# Patient Record
(demographics unavailable — no encounter records)

---

## 2025-01-29 NOTE — DISCUSSION/SUMMARY
[___ Month(s)] : in [unfilled] month(s) [FreeTextEntry3] : Or sooner if needed. [EKG obtained to assist in diagnosis and management of assessed problem(s)] : EKG obtained to assist in diagnosis and management of assessed problem(s)

## 2025-01-29 NOTE — CARDIOLOGY SUMMARY
[de-identified] : 1/29/25, Suspect atrial fibrillation. 1/29/24, Sinus Bradycardia , -Left atrial enlargement.-Anteroseptal infarct -age undetermined.  [de-identified] : 7/28/21, EF 55%, limited views.
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
## 2025-02-27 NOTE — REASON FOR VISIT
What Is The Reason For Today's Visit?: Full Body Skin Examination with No Concerns What Is The Reason For Today's Visit? (Being Monitored For X): concerning skin lesions on a periodic basis How Severe Are Your Spot(S)?: moderate [Follow-Up: _____] : a [unfilled] follow-up visit
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
## 2025-03-03 NOTE — PHYSICAL EXAM
[FreeTextEntry1] : Exam limited via telehealth: MS: awake, alert, coherent, fluent, comprehension intact, affect stable. oriented CN: EOMI, face symmetrical, grimace, smile symmetrical, eye closure symmetrical, hearing intact grossly.  Motor: moving all 4 extremities freely. Coord: FFM and to screen intact Walking / Sensation deferred. Cardiac/pulmonary/extremity exam deferred via telehealth.

## 2025-03-03 NOTE — ASSESSMENT
[FreeTextEntry1] : MR DEBBY BRODY is 68 yo male with history of 3 recent seizures in the setting of ETOH abuse and head contusion/SAH from fall. Currently getting lacosamide and levetiracetam for recurrent seizures. Not using alcohol any longer and seizure control good when taking medications. For now will continue both LAC and LEV, but over time may taper off LEV, nick if there are side effects.  Plan: 1. continue current ASM regimen -lacosamide 200 q12, levetiracetam  q12 2.reviewed seizure triggers 3. annual labwork 4. follow up in 6 months

## 2025-03-03 NOTE — REASON FOR VISIT
[Home] : at home, [unfilled] , at the time of the visit. [Medical Office: ([facility])___] : at the medical office located in  [Telehealth (audio & video)] : This visit was provided via telehealth using real-time 2-way audio visual technology. [Verbal consent obtained from patient] : the patient, [unfilled] [Follow-Up: _____] : a [unfilled] follow-up visit [Family Member] : family member

## 2025-03-03 NOTE — ASSESSMENT
[FreeTextEntry1] : MR DEBBY BRODY is 69 yo male with history of 3 seizures in the setting of ETOH abuse and head contusion/SAH from fall. Last sz 02/05/2024, Now on Levetiracetam 750 mg ER bid and Lacosamide 200 mg bid well tolerated, compliant. Patient denies using alcohol now. Telemed exam (limited) non focal. No new complains.    Plan: - Cont Levetiracetam 750 mg ER bid - Cont Lacosamide 200 mg bid  - Reviewed seizure triggers - Daughter will email [id number] Medical Certification for Disability Exceptions  -  all questions answered - Follow up in 6 months with NO Monteiro, knows to reach out sooner prn   x time 25 min

## 2025-03-03 NOTE — HISTORY OF PRESENT ILLNESS
[FreeTextEntry1] : cardiologist Monik  [phone] Current meds: Levetiracetam 750 mg ER bid Lacosamide 200 mg bid   *** 02/28/2025 ***   New patient to me, seen by NO Weber (since retired). GAGANDEEP SILVERIO 69 yo RHM, accompanied by his daughter who translates    ***:5/28/2024** Mr Gagandeep Silverio is here today for a scheduled follow up office visit and is accompanied by his daughter He is doing well with no reported interval seizures. No  complaints or side effects  ***:2/26/2024** Mr Gagandeep Silverio is here today for a scheduled follow up office visit and is accompanied by his daughter who translates  Recent hospital admission as per below:   Hospital Course: Discharge Date	08-Feb-2024 Admission Date	05-Feb-2024 22:48 Reason for Admission	Seizures Hospital Course	 Patient is a 67-year-old male with past medical history of EtOH abuse last drink 1 year ago per daughter, HTN, paroxismal a fib not on AC, subarachnoid bleed in January 2021, seizures currently on Vimpat presents to emergency department with witnessed seizure.  Daughter reported patient became unresponsive and had generalized shaking lasting about 3 mins.  Patient brought to emergency department by EMS.  Patient had another witnessed seizure while in the ambulance bay lasting 2 to 3 minutes stopping without medications. Daughter states that patient recently stopped Keppra as per his neurologist about a week ago and is currently only taking Vimpat.  Daughter denies any recent illness.  No recent alcohol consumption per daughter.   Hx of Seizures with recurrent episodes - Neuro consulted, no further inpatient work up needed - continue Keppra 750 mg ER BID and Vimpat 200 mg BID - Follow up with Dr. Sims as outpatient.   HTN - stable - will monitor and start BP meds if needed   Paroxismal a fib - was diagnosed on previous admission - at that time, chadsvasc score was 1, not on AC given hx of SAH, active ETOH and recurrent fall risk of starting AC is outweigh of benefit - ASA - Cards consulted - SALLIE w/  nL LVSF and EF of 58% w/ no WMA, trace MR   HX of Subarachnoid bleed - Chronic Hx - CT head w/o evidence of bleed   On 2/8/24,  case was discussed with , patient is medically cleared and optimized for discharge today. All medications were reviewed with attending, and sent to mutually agreed upon pharmacy.     Med Reconciliation: Override IMPROVE-DD recommendations due to:	IMPROVE-DD Application Not Available Recommended Post-Discharge VTE Prophylaxis	IMPROVE-DD Application Not Available Medication Reconciliation Status	Admission Reconciliation is Completed Discharge Reconciliation is Completed   Discharge Medications	aspirin 81 mg oral tablet, chewable: 1 tab(s) orally once a day folic acid 1 mg oral tablet: 1 tab(s) orally once a day Keppra  mg oral tablet, extended release: 1 tab(s) orally every 12 hours lacosamide 200 mg oral tablet: 1 tab(s) orally 2 times a day MDD:400mg Lubrifresh P.M. ophthalmic ointment: 1 application in the right eye once a day (at bedtime) Multiple Vitamins oral tablet: 1 tab(s) orally once a day Systane ophthalmic solution: 1 drop(s) in the right eye every 2 hours tobramycin 0.3% ophthalmic solution: 1 drop(s) in the right eye 4 times a day     ***Of note clarified that I did not advise patient to stop Levetiracetam. Patient stopped on his own which triggered  the seizure  No reported interval seizures Mood better ,   ***UPDATE:12/20/2023*** Mr Gagandeep Silverio is here today for a scheduled follow up office visit and  is accompanied by his daughter. He reports no interval seizures or side effects. He has not followed up with cardiology as suggested at last office visit  Lacosamide 200 mg BID Levetiracetam 750 mg BID  *** 08/02/2023  *** [facility]. [facility]IL[facility]IO [facility] for breakthrough seizure that occurred in setting of having reduced his ASM dose to stretch out supply.  Mr. SILVERIO has not used alcohol in >2 yrs.  Also found to be in Afib, with decision to treat for rate control but not give AC due to fall risk from seizures.  EKG reportedly shows bundle branch block as well.  He was started on LEV in addition to LAC, and currently reports no side effects. No symptoms of increased irritation or mood change per family member.   *** 08/02/2021  *** Mr. SILVERIO had seizure 7/29/21 after stopping Vimpat x 4 days (due to family concern for resumption of drinking.) He has not taken lacosamide since discharge from hospital last Friday due to need to renew PA.  CT done in hospital shows chronic encephalomalacia in R frontal region, no subdural collection or acute change. DC summary notes that Mr. SILVERIO was found to have new onset afib but deemed that AC risks outweigh benefit given h/o ICH. Mr. SILVERIO was started on ASA instead.   ***UPDATE:4/1/2021*** MR GAGANDEEP SILVERIO is here today for a scheduled follow up visit. He is accompanied by his daughter. Patient had a recent admission to [facility] after a seizure.Apparently he had run out of his Vimpat for 3 days and did not get refill . He suffered a subsequent seizure. He continues to deny any ETOH use  Vimpat 200mg BID  *** 03/09/2021  *** MR GAGANDEEP SILVERIO is a 65 yo right handed male and s here today for a new patient vist . He is accompanied by his daughter who helps with his history. Mr Silverio has a history of ETOH abuse . He suffered a fall with loss of consciousness ~ 5 minutes while intoxicated on January 28, 2021 and was taken to  [facility].He was noted to have right frontal contusion and right cerebral SAH on imaging. He was   transferred to [facility] on 1/20/2021 where he suffered ETOH withdrawal and 3 subsequent seizures in one day. A REEG revealed right temporal subclinical seizure.  He developed aspiration pneumonia and a G tube was placed due to difficulty swallowing. Eventually he was sent to rehab 2/18/2021 and discharged home on 2/22/2021  Mr Awad daughter reports no use of  ETOH since leaving the hospital. He has some complaints of dizziness. Low BP after leaving hospital sees cardiology   current medication regimen: Vimpat  200mg BID Nadolol 20mg daily Folic acid Clonidine 0.1 mg daily Gabapentin 100mg BID multivitamin  Pantoprazole 40mg daily

## 2025-03-03 NOTE — ASSESSMENT
[FreeTextEntry1] : MR DEBBY BRODY is 67 yo male with history of 3 seizures in the setting of ETOH abuse and head contusion/SAH from fall. Last sz 02/05/2024, Now on Levetiracetam 750 mg ER bid and Lacosamide 200 mg bid well tolerated, compliant. Patient denies using alcohol now. Telemed exam (limited) non focal. No new complains.    Plan: - Cont Levetiracetam 750 mg ER bid - Cont Lacosamide 200 mg bid  - Reviewed seizure triggers - Daughter will email [id number] Medical Certification for Disability Exceptions  -  all questions answered - Follow up in 6 months with NO Monteiro, knows to reach out sooner prn   x time 25 min

## 2025-03-03 NOTE — REASON FOR VISIT
[Home] : at home, [unfilled] , at the time of the visit. [Medical Office: ([facility])___] : at the medical office located in  [Telehealth (audio & video)] : This visit was provided via telehealth using real-time 2-way audio visual technology. [Verbal consent obtained from patient] : the patient, [unfilled] [Follow-Up: _____] : a [unfilled] follow-up visit [Family Member] : family member

## 2025-06-17 NOTE — DATA REVIEWED
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

## 2025-06-17 NOTE — ASSESSMENT
[FreeTextEntry1] : DEBBY BRODY 67 yo RHM with past medical history of EtOH abuse, HTN, paroxismal a fib not on AC, subarachnoid bleed in January 2021 and seizures in the setting of ETOH abuse and head contusion/SAH from fall. Last sz 02/05/2024, Now on Levetiracetam 750 mg ER bid and Lacosamide 200 mg bid well tolerated, compliant.  No interval seizures reported As per the daughter progressive memory issues, change in a behavior, mood fluctuations. Will update diagnostic testing and refer to memory team,    Plan: - Cont Levetiracetam 750 mg ER bid - Cont Lacosamide 200 mg bid  - Reviewed seizure triggers - ordered 24hr aEEG  - ordered MRI brain wwo gado - referred to memory team -  all questions answered - Follow up with results with NO Monteiro, knows to reach out sooner prn   x time 25 min

## 2025-06-17 NOTE — HISTORY OF PRESENT ILLNESS
[FreeTextEntry1] : cardiologist Monik  [phone]  Current ASMs: Levetiracetam  mg bid  Lacosamide 200 mg bid   *** 06/17/2025 *** accompanied by his daughter Izabella who translates from [employer].  GAGANDEEP BRODY 69 yo RHM with past medical history of EtOH abuse, HTN, paroxismal a fib not on AC, subarachnoid bleed in January 2021, here for a follow up visit  No interval seizures. On Levetiracetam  mg bid and Lacosamide 200 mg bid, well tolerated.  Last seizure Feb 5 2024.  As per daughter since May 2025 patient has been increasingly agitated and paranoid says he needs to go back to his country or live by himself. He does not drive, lives with wife, 3 daughters and 2 grandkids. Denies drinking ETOH. 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   *** 03/03/2025 ***   New patient to me, seen by NO Weber (since retired). GAGANDEEP BRODY 69 yo RHM, accompanied by his daughter Izabella who translates from [employer].  No interval seizures reported on Levetiracetam 750 mg ER bid and Lacosamide 200 mg bid, well tolerated, compliant, his daughter and wife remind him to take his ASMs. Last seizure Feb 5 2024. He has difficulties with concentration and memory non progressing. He does not drive, dos not drink alcohol, smokes cigarettes. Lives with wife.  No new complains.    ***:5/28/2024** Mr Gagandeep Brody is here today for a scheduled follow up office visit and is accompanied by his daughter He is doing well with no reported interval seizures. No complaints or side effects  ***:2/26/2024** Mr Gagandeep Brody is here today for a scheduled follow up office visit and is accompanied by his daughter who translates  Recent hospital admission as per below:   Hospital Course: Discharge Date	08-Feb-2024 Admission Date	05-Feb-2024 22:48 Reason for Admission	Seizures Hospital Course	 Patient is a 67-year-old male with past medical history of EtOH abuse last drink 1 year ago per daughter, HTN, paroxismal a fib not on AC, subarachnoid bleed in January 2021, seizures currently on Vimpat presents to emergency department with witnessed seizure.  Daughter reported patient became unresponsive and had generalized shaking lasting about 3 mins.  Patient brought to emergency department by EMS.  Patient had another witnessed seizure while in the ambulance bay lasting 2 to 3 minutes stopping without medications. Daughter states that patient recently stopped Keppra as per his neurologist about a week ago and is currently only taking Vimpat.  Daughter denies any recent illness.  No recent alcohol consumption per daughter.   Hx of Seizures with recurrent episodes - Neuro consulted, no further inpatient work up needed - continue Keppra 750 mg ER BID and Vimpat 200 mg BID - Follow up with Dr. Sims as outpatient.   HTN - stable - will monitor and start BP meds if needed   Paroxismal a fib - was diagnosed on previous admission - at that time, chadsvasc score was 1, not on AC given hx of SAH, active ETOH and recurrent fall risk of starting AC is outweigh of benefit - ASA - Cards consulted - SALLIE w/  nL LVSF and EF of 58% w/ no WMA, trace MR   HX of Subarachnoid bleed - Chronic Hx - CT head w/o evidence of bleed   On 2/8/24,  case was discussed with , patient is medically cleared and optimized for discharge today. All medications were reviewed with attending, and sent to mutually agreed upon pharmacy.     Med Reconciliation: Override IMPROVE-DD recommendations due to:	IMPROVE-DD Application Not Available Recommended Post-Discharge VTE Prophylaxis	IMPROVE-DD Application Not Available Medication Reconciliation Status	Admission Reconciliation is Completed Discharge Reconciliation is Completed   Discharge Medications	aspirin 81 mg oral tablet, chewable: 1 tab(s) orally once a day folic acid 1 mg oral tablet: 1 tab(s) orally once a day Keppra  mg oral tablet, extended release: 1 tab(s) orally every 12 hours lacosamide 200 mg oral tablet: 1 tab(s) orally 2 times a day MDD:400mg Lubrifresh P.M. ophthalmic ointment: 1 application in the right eye once a day (at bedtime) Multiple Vitamins oral tablet: 1 tab(s) orally once a day Systane ophthalmic solution: 1 drop(s) in the right eye every 2 hours tobramycin 0.3% ophthalmic solution: 1 drop(s) in the right eye 4 times a day     ***Of note clarified that I did not advise patient to stop Levetiracetam. Patient stopped on his own which triggered  the seizure  No reported interval seizures Mood better ,   ***UPDATE:12/20/2023*** Mr Gagandeep Brody is here today for a scheduled follow up office visit and  is accompanied by his daughter. He reports no interval seizures or side effects. He has not followed up with cardiology as suggested at last office visit  Lacosamide 200 mg BID Levetiracetam 750 mg BID  *** 08/02/2023  *** Mr. BRODY was hospitalized at [facility] in early July for breakthrough seizure that occurred in setting of having reduced his ASM dose to stretch out supply.  Mr. BRODY has not used alcohol in >2 yrs.  Also found to be in Afib, with decision to treat for rate control but not give AC due to fall risk from seizures.  EKG reportedly shows bundle branch block as well.  He was started on LEV in addition to LAC, and currently reports no side effects. No symptoms of increased irritation or mood change per family member.   *** 08/02/2021  *** Mr. BRODY had seizure 7/29/21 after stopping Vimpat x 4 days (due to family concern for resumption of drinking.) He has not taken lacosamide since discharge from hospital last Friday due to need to renew PA.  CT done in hospital shows chronic encephalomalacia in R frontal region, no subdural collection or acute change. DC summary notes that Mr. BRODY was found to have new onset afib but deemed that AC risks outweigh benefit given h/o ICH. Mr. BRODY was started on ASA instead.   ***UPDATE:4/1/2021*** MR GAGANDEEP BRODY is here today for a scheduled follow up visit. He is accompanied by his daughter. Patient had a recent admission to [facility] after a seizure.Apparently he had run out of his Vimpat for 3 days and did not get refill . He suffered a subsequent seizure. He continues to deny any ETOH use  Vimpat 200mg BID  *** 03/09/2021  *** MR GAGANDEEP BRODY is a 65 yo right handed male and s here today for a new patient vist . He is accompanied by his daughter who helps with his history. Mr Brody has a history of ETOH abuse . He suffered a fall with loss of consciousness ~ 5 minutes while intoxicated on January 28, 2021 and was taken to  [facility].He was noted to have right frontal contusion and right cerebral SAH on imaging. He was   transferred to [facility] on 1/20/2021 where he suffered ETOH withdrawal and 3 subsequent seizures in one day. A REEG revealed right temporal subclinical seizure.  He developed aspiration pneumonia and a G tube was placed due to difficulty swallowing. Eventually he was sent to rehab 2/18/2021 and discharged home on 2/22/2021  Mr Delmi daughter reports no use of  ETOH since leaving the hospital. He has some complaints of dizziness. Low BP after leaving hospital sees cardiology   current medication regimen: Vimpat  200mg BID Nadolol 20mg daily Folic acid Clonidine 0.1 mg daily Gabapentin 100mg BID multivitamin  Pantoprazole 40mg daily

## 2025-06-17 NOTE — HISTORY OF PRESENT ILLNESS
[FreeTextEntry1] : cardiologist Monik  [phone]  Current ASMs: Levetiracetam  mg bid  Lacosamide 200 mg bid   *** 06/17/2025 *** accompanied by his daughter Izabella who translates from [employer].  GAGANDEEP BRODY 67 yo RHM with past medical history of EtOH abuse, HTN, paroxismal a fib not on AC, subarachnoid bleed in January 2021, here for a follow up visit  No interval seizures. On Levetiracetam  mg bid and Lacosamide 200 mg bid, well tolerated.  Last seizure Feb 5 2024.  As per daughter since May 2025 patient has been increasingly agitated and paranoid says he needs to go back to his country or live by himself. He does not drive, lives with wife, 3 daughters and 2 grandkids. Denies drinking ETOH. 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   *** 03/03/2025 ***   New patient to me, seen by NO Weber (since retired). GAGANDEEP BRODY 67 yo RHM, accompanied by his daughter Izabella who translates from [employer].  No interval seizures reported on Levetiracetam 750 mg ER bid and Lacosamide 200 mg bid, well tolerated, compliant, his daughter and wife remind him to take his ASMs. Last seizure Feb 5 2024. He has difficulties with concentration and memory non progressing. He does not drive, dos not drink alcohol, smokes cigarettes. Lives with wife.  No new complains.    ***:5/28/2024** Mr Gagandeep Brody is here today for a scheduled follow up office visit and is accompanied by his daughter He is doing well with no reported interval seizures. No complaints or side effects  ***:2/26/2024** Mr Gagandeep Brody is here today for a scheduled follow up office visit and is accompanied by his daughter who translates  Recent hospital admission as per below:   Hospital Course: Discharge Date	08-Feb-2024 Admission Date	05-Feb-2024 22:48 Reason for Admission	Seizures Hospital Course	 Patient is a 67-year-old male with past medical history of EtOH abuse last drink 1 year ago per daughter, HTN, paroxismal a fib not on AC, subarachnoid bleed in January 2021, seizures currently on Vimpat presents to emergency department with witnessed seizure.  Daughter reported patient became unresponsive and had generalized shaking lasting about 3 mins.  Patient brought to emergency department by EMS.  Patient had another witnessed seizure while in the ambulance bay lasting 2 to 3 minutes stopping without medications. Daughter states that patient recently stopped Keppra as per his neurologist about a week ago and is currently only taking Vimpat.  Daughter denies any recent illness.  No recent alcohol consumption per daughter.   Hx of Seizures with recurrent episodes - Neuro consulted, no further inpatient work up needed - continue Keppra 750 mg ER BID and Vimpat 200 mg BID - Follow up with Dr. Sims as outpatient.   HTN - stable - will monitor and start BP meds if needed   Paroxismal a fib - was diagnosed on previous admission - at that time, chadsvasc score was 1, not on AC given hx of SAH, active ETOH and recurrent fall risk of starting AC is outweigh of benefit - ASA - Cards consulted - SALLIE w/  nL LVSF and EF of 58% w/ no WMA, trace MR   HX of Subarachnoid bleed - Chronic Hx - CT head w/o evidence of bleed   On 2/8/24,  case was discussed with , patient is medically cleared and optimized for discharge today. All medications were reviewed with attending, and sent to mutually agreed upon pharmacy.     Med Reconciliation: Override IMPROVE-DD recommendations due to:	IMPROVE-DD Application Not Available Recommended Post-Discharge VTE Prophylaxis	IMPROVE-DD Application Not Available Medication Reconciliation Status	Admission Reconciliation is Completed Discharge Reconciliation is Completed   Discharge Medications	aspirin 81 mg oral tablet, chewable: 1 tab(s) orally once a day folic acid 1 mg oral tablet: 1 tab(s) orally once a day Keppra  mg oral tablet, extended release: 1 tab(s) orally every 12 hours lacosamide 200 mg oral tablet: 1 tab(s) orally 2 times a day MDD:400mg Lubrifresh P.M. ophthalmic ointment: 1 application in the right eye once a day (at bedtime) Multiple Vitamins oral tablet: 1 tab(s) orally once a day Systane ophthalmic solution: 1 drop(s) in the right eye every 2 hours tobramycin 0.3% ophthalmic solution: 1 drop(s) in the right eye 4 times a day     ***Of note clarified that I did not advise patient to stop Levetiracetam. Patient stopped on his own which triggered  the seizure  No reported interval seizures Mood better ,   ***UPDATE:12/20/2023*** Mr Gagandeep Brody is here today for a scheduled follow up office visit and  is accompanied by his daughter. He reports no interval seizures or side effects. He has not followed up with cardiology as suggested at last office visit  Lacosamide 200 mg BID Levetiracetam 750 mg BID  *** 08/02/2023  *** Mr. BRODY was hospitalized at [facility] in early July for breakthrough seizure that occurred in setting of having reduced his ASM dose to stretch out supply.  Mr. BRODY has not used alcohol in >2 yrs.  Also found to be in Afib, with decision to treat for rate control but not give AC due to fall risk from seizures.  EKG reportedly shows bundle branch block as well.  He was started on LEV in addition to LAC, and currently reports no side effects. No symptoms of increased irritation or mood change per family member.   *** 08/02/2021  *** Mr. BRODY had seizure 7/29/21 after stopping Vimpat x 4 days (due to family concern for resumption of drinking.) He has not taken lacosamide since discharge from hospital last Friday due to need to renew PA.  CT done in hospital shows chronic encephalomalacia in R frontal region, no subdural collection or acute change. DC summary notes that Mr. BRODY was found to have new onset afib but deemed that AC risks outweigh benefit given h/o ICH. Mr. BRODY was started on ASA instead.   ***UPDATE:4/1/2021*** MR GAGANDEEP BRODY is here today for a scheduled follow up visit. He is accompanied by his daughter. Patient had a recent admission to [facility] after a seizure.Apparently he had run out of his Vimpat for 3 days and did not get refill . He suffered a subsequent seizure. He continues to deny any ETOH use  Vimpat 200mg BID  *** 03/09/2021  *** MR GAGANDEEP BRODY is a 63 yo right handed male and s here today for a new patient vist . He is accompanied by his daughter who helps with his history. Mr Brody has a history of ETOH abuse . He suffered a fall with loss of consciousness ~ 5 minutes while intoxicated on January 28, 2021 and was taken to  [facility].He was noted to have right frontal contusion and right cerebral SAH on imaging. He was   transferred to [facility] on 1/20/2021 where he suffered ETOH withdrawal and 3 subsequent seizures in one day. A REEG revealed right temporal subclinical seizure.  He developed aspiration pneumonia and a G tube was placed due to difficulty swallowing. Eventually he was sent to rehab 2/18/2021 and discharged home on 2/22/2021  Mr Delmi daughter reports no use of  ETOH since leaving the hospital. He has some complaints of dizziness. Low BP after leaving hospital sees cardiology   current medication regimen: Vimpat  200mg BID Nadolol 20mg daily Folic acid Clonidine 0.1 mg daily Gabapentin 100mg BID multivitamin  Pantoprazole 40mg daily

## 2025-06-17 NOTE — HISTORY OF PRESENT ILLNESS
[FreeTextEntry1] : cardiologist Monik  [phone]  Current ASMs: Levetiracetam  mg bid  Lacosamide 200 mg bid   *** 06/17/2025 *** accompanied by his daughter Izabella who translates from [employer].  GAGANDEEP BRODY 69 yo RHM with past medical history of EtOH abuse, HTN, paroxismal a fib not on AC, subarachnoid bleed in January 2021, here for a follow up visit  No interval seizures. On Levetiracetam  mg bid and Lacosamide 200 mg bid, well tolerated.  Last seizure Feb 5 2024.  As per daughter since May 2025 patient has been increasingly agitated and paranoid says he needs to go back to his country or live by himself. He does not drive, lives with wife, 3 daughters and 2 grandkids. Denies drinking ETOH. 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   *** 03/03/2025 ***   New patient to me, seen by NO Weber (since retired). GAGANDEEP BRODY 69 yo RHM, accompanied by his daughter Izabella who translates from [employer].  No interval seizures reported on Levetiracetam 750 mg ER bid and Lacosamide 200 mg bid, well tolerated, compliant, his daughter and wife remind him to take his ASMs. Last seizure Feb 5 2024. He has difficulties with concentration and memory non progressing. He does not drive, dos not drink alcohol, smokes cigarettes. Lives with wife.  No new complains.    ***:5/28/2024** Mr Gagandeep Brody is here today for a scheduled follow up office visit and is accompanied by his daughter He is doing well with no reported interval seizures. No complaints or side effects  ***:2/26/2024** Mr Gagandeep Brody is here today for a scheduled follow up office visit and is accompanied by his daughter who translates  Recent hospital admission as per below:   Hospital Course: Discharge Date	08-Feb-2024 Admission Date	05-Feb-2024 22:48 Reason for Admission	Seizures Hospital Course	 Patient is a 67-year-old male with past medical history of EtOH abuse last drink 1 year ago per daughter, HTN, paroxismal a fib not on AC, subarachnoid bleed in January 2021, seizures currently on Vimpat presents to emergency department with witnessed seizure.  Daughter reported patient became unresponsive and had generalized shaking lasting about 3 mins.  Patient brought to emergency department by EMS.  Patient had another witnessed seizure while in the ambulance bay lasting 2 to 3 minutes stopping without medications. Daughter states that patient recently stopped Keppra as per his neurologist about a week ago and is currently only taking Vimpat.  Daughter denies any recent illness.  No recent alcohol consumption per daughter.   Hx of Seizures with recurrent episodes - Neuro consulted, no further inpatient work up needed - continue Keppra 750 mg ER BID and Vimpat 200 mg BID - Follow up with Dr. Sims as outpatient.   HTN - stable - will monitor and start BP meds if needed   Paroxismal a fib - was diagnosed on previous admission - at that time, chadsvasc score was 1, not on AC given hx of SAH, active ETOH and recurrent fall risk of starting AC is outweigh of benefit - ASA - Cards consulted - SALLIE w/  nL LVSF and EF of 58% w/ no WMA, trace MR   HX of Subarachnoid bleed - Chronic Hx - CT head w/o evidence of bleed   On 2/8/24,  case was discussed with , patient is medically cleared and optimized for discharge today. All medications were reviewed with attending, and sent to mutually agreed upon pharmacy.     Med Reconciliation: Override IMPROVE-DD recommendations due to:	IMPROVE-DD Application Not Available Recommended Post-Discharge VTE Prophylaxis	IMPROVE-DD Application Not Available Medication Reconciliation Status	Admission Reconciliation is Completed Discharge Reconciliation is Completed   Discharge Medications	aspirin 81 mg oral tablet, chewable: 1 tab(s) orally once a day folic acid 1 mg oral tablet: 1 tab(s) orally once a day Keppra  mg oral tablet, extended release: 1 tab(s) orally every 12 hours lacosamide 200 mg oral tablet: 1 tab(s) orally 2 times a day MDD:400mg Lubrifresh P.M. ophthalmic ointment: 1 application in the right eye once a day (at bedtime) Multiple Vitamins oral tablet: 1 tab(s) orally once a day Systane ophthalmic solution: 1 drop(s) in the right eye every 2 hours tobramycin 0.3% ophthalmic solution: 1 drop(s) in the right eye 4 times a day     ***Of note clarified that I did not advise patient to stop Levetiracetam. Patient stopped on his own which triggered  the seizure  No reported interval seizures Mood better ,   ***UPDATE:12/20/2023*** Mr Gagandeep Brody is here today for a scheduled follow up office visit and  is accompanied by his daughter. He reports no interval seizures or side effects. He has not followed up with cardiology as suggested at last office visit  Lacosamide 200 mg BID Levetiracetam 750 mg BID  *** 08/02/2023  *** Mr. BRODY was hospitalized at [facility] in early July for breakthrough seizure that occurred in setting of having reduced his ASM dose to stretch out supply.  Mr. BRODY has not used alcohol in >2 yrs.  Also found to be in Afib, with decision to treat for rate control but not give AC due to fall risk from seizures.  EKG reportedly shows bundle branch block as well.  He was started on LEV in addition to LAC, and currently reports no side effects. No symptoms of increased irritation or mood change per family member.   *** 08/02/2021  *** Mr. BRODY had seizure 7/29/21 after stopping Vimpat x 4 days (due to family concern for resumption of drinking.) He has not taken lacosamide since discharge from hospital last Friday due to need to renew PA.  CT done in hospital shows chronic encephalomalacia in R frontal region, no subdural collection or acute change. DC summary notes that Mr. BRODY was found to have new onset afib but deemed that AC risks outweigh benefit given h/o ICH. Mr. BRODY was started on ASA instead.   ***UPDATE:4/1/2021*** MR GAGANDEEP BRODY is here today for a scheduled follow up visit. He is accompanied by his daughter. Patient had a recent admission to [facility] after a seizure.Apparently he had run out of his Vimpat for 3 days and did not get refill . He suffered a subsequent seizure. He continues to deny any ETOH use  Vimpat 200mg BID  *** 03/09/2021  *** MR GAGANDEEP BRODY is a 65 yo right handed male and s here today for a new patient vist . He is accompanied by his daughter who helps with his history. Mr Brody has a history of ETOH abuse . He suffered a fall with loss of consciousness ~ 5 minutes while intoxicated on January 28, 2021 and was taken to  [facility].He was noted to have right frontal contusion and right cerebral SAH on imaging. He was   transferred to [facility] on 1/20/2021 where he suffered ETOH withdrawal and 3 subsequent seizures in one day. A REEG revealed right temporal subclinical seizure.  He developed aspiration pneumonia and a G tube was placed due to difficulty swallowing. Eventually he was sent to rehab 2/18/2021 and discharged home on 2/22/2021  Mr Delmi daughter reports no use of  ETOH since leaving the hospital. He has some complaints of dizziness. Low BP after leaving hospital sees cardiology   current medication regimen: Vimpat  200mg BID Nadolol 20mg daily Folic acid Clonidine 0.1 mg daily Gabapentin 100mg BID multivitamin  Pantoprazole 40mg daily

## 2025-06-17 NOTE — DATA REVIEWED
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

## 2025-06-17 NOTE — DISCUSSION/SUMMARY
[Complex Partial] : complex partial [Secondary Generalization] : secondary generalization [Focal] : focal

## 2025-06-17 NOTE — ASSESSMENT
[FreeTextEntry1] : DEBBY BRODY 69 yo RHM with past medical history of EtOH abuse, HTN, paroxismal a fib not on AC, subarachnoid bleed in January 2021 and seizures in the setting of ETOH abuse and head contusion/SAH from fall. Last sz 02/05/2024, Now on Levetiracetam 750 mg ER bid and Lacosamide 200 mg bid well tolerated, compliant.  No interval seizures reported As per the daughter progressive memory issues, change in a behavior, mood fluctuations. Will update diagnostic testing and refer to memory team,    Plan: - Cont Levetiracetam 750 mg ER bid - Cont Lacosamide 200 mg bid  - Reviewed seizure triggers - ordered 24hr aEEG  - ordered MRI brain wwo gado - referred to memory team -  all questions answered - Follow up with results with NO Monteiro, knows to reach out sooner prn   x time 25 min

## 2025-06-17 NOTE — PHYSICAL EXAM
[General Appearance - Alert] : alert [General Appearance - In No Acute Distress] : in no acute distress [Oriented To Time, Place, And Person] : oriented to person, place, and time [Person] : oriented to person [Cranial Nerves Optic (II)] : visual acuity intact bilaterally,  visual fields full to confrontation, pupils equal round and reactive to light [Cranial Nerves Oculomotor (III)] : extraocular motion intact [Cranial Nerves Trigeminal (V)] : facial sensation intact symmetrically [Cranial Nerves Facial (VII)] : face symmetrical [Cranial Nerves Vestibulocochlear (VIII)] : hearing was intact bilaterally [Cranial Nerves Glossopharyngeal (IX)] : tongue and palate midline [Cranial Nerves Accessory (XI - Cranial And Spinal)] : head turning and shoulder shrug symmetric [Cranial Nerves Hypoglossal (XII)] : there was no tongue deviation with protrusion [Motor Handedness Right-Handed] : the patient is right hand dominant [Sensation Tactile Decrease] : light touch was intact [Balance] : balance was intact [2+] : Brachioradialis left 2+ [1+] : Ankle jerk left 1+ [Outer Ear] : the ears and nose were normal in appearance [Neck Appearance] : the appearance of the neck was normal [Abnormal Walk] : normal gait [Skin Color & Pigmentation] : normal skin color and pigmentation [] : no rash [Place] : disoriented to place [Time] : disoriented to time [Paresis Pronator Drift Right-Sided] : no pronator drift on the right [Paresis Pronator Drift Left-Sided] : no pronator drift on the left

## 2025-06-17 NOTE — DATA REVIEWED
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